# Patient Record
Sex: FEMALE | Race: WHITE | NOT HISPANIC OR LATINO | URBAN - METROPOLITAN AREA
[De-identification: names, ages, dates, MRNs, and addresses within clinical notes are randomized per-mention and may not be internally consistent; named-entity substitution may affect disease eponyms.]

---

## 2017-07-06 ENCOUNTER — FOLLOW UP ESTABLISHED (OUTPATIENT)
Dept: URBAN - METROPOLITAN AREA CLINIC 64 | Facility: CLINIC | Age: 62
End: 2017-07-06

## 2017-07-06 DIAGNOSIS — H35.373 PUCKERING OF MACULA, BILATERAL: ICD-10-CM

## 2017-07-06 DIAGNOSIS — H52.13 MYOPIA, BILATERAL: Primary | ICD-10-CM

## 2017-07-06 PROCEDURE — S0621 ROUTINE OPHTHALMOLOGICAL EXA: HCPCS | Performed by: OPTOMETRIST

## 2017-07-06 ASSESSMENT — KERATOMETRY
OD: 45.29
OS: 44.98

## 2017-07-06 ASSESSMENT — INTRAOCULAR PRESSURE
OD: 20
OS: 20

## 2017-07-06 ASSESSMENT — VISUAL ACUITY
OS: 20/30
OD: 20/20

## 2017-08-15 ENCOUNTER — FOLLOW UP ESTABLISHED (OUTPATIENT)
Dept: URBAN - METROPOLITAN AREA CLINIC 64 | Facility: CLINIC | Age: 62
End: 2017-08-15
Payer: COMMERCIAL

## 2017-08-15 PROCEDURE — 92134 CPTRZ OPH DX IMG PST SGM RTA: CPT | Performed by: OPTOMETRIST

## 2017-08-15 PROCEDURE — 92014 COMPRE OPH EXAM EST PT 1/>: CPT | Performed by: OPTOMETRIST

## 2017-08-15 ASSESSMENT — INTRAOCULAR PRESSURE
OD: 19
OS: 22

## 2019-05-23 ENCOUNTER — FOLLOW UP ESTABLISHED (OUTPATIENT)
Dept: URBAN - METROPOLITAN AREA CLINIC 64 | Facility: CLINIC | Age: 64
End: 2019-05-23
Payer: COMMERCIAL

## 2019-05-23 PROCEDURE — 92014 COMPRE OPH EXAM EST PT 1/>: CPT | Performed by: OPTOMETRIST

## 2019-05-23 PROCEDURE — 92015 DETERMINE REFRACTIVE STATE: CPT | Performed by: OPTOMETRIST

## 2019-05-23 ASSESSMENT — INTRAOCULAR PRESSURE
OD: 16
OS: 16

## 2019-05-23 ASSESSMENT — KERATOMETRY
OD: 45.31
OS: 44.98

## 2019-05-23 ASSESSMENT — VISUAL ACUITY
OD: 20/20
OS: 20/40

## 2019-06-05 ENCOUNTER — RX CHECK (OUTPATIENT)
Dept: URBAN - METROPOLITAN AREA CLINIC 64 | Facility: CLINIC | Age: 64
End: 2019-06-05

## 2019-06-05 PROCEDURE — 92015 DETERMINE REFRACTIVE STATE: CPT | Performed by: OPTOMETRIST

## 2019-06-05 ASSESSMENT — VISUAL ACUITY
OS: 20/40
OD: 20/20

## 2020-01-23 ENCOUNTER — FOLLOW UP ESTABLISHED (OUTPATIENT)
Dept: URBAN - METROPOLITAN AREA CLINIC 64 | Facility: CLINIC | Age: 65
End: 2020-01-23
Payer: MEDICARE

## 2020-01-23 PROCEDURE — 92014 COMPRE OPH EXAM EST PT 1/>: CPT | Performed by: OPTOMETRIST

## 2020-01-23 PROCEDURE — 92134 CPTRZ OPH DX IMG PST SGM RTA: CPT | Performed by: OPTOMETRIST

## 2020-01-23 ASSESSMENT — INTRAOCULAR PRESSURE
OS: 21
OD: 19

## 2020-02-20 ENCOUNTER — FOLLOW UP ESTABLISHED (OUTPATIENT)
Dept: URBAN - METROPOLITAN AREA CLINIC 24 | Facility: CLINIC | Age: 65
End: 2020-02-20
Payer: MEDICARE

## 2020-02-20 DIAGNOSIS — H43.391 OTHER VITREOUS OPACITIES, RIGHT EYE: ICD-10-CM

## 2020-02-20 PROCEDURE — 92134 CPTRZ OPH DX IMG PST SGM RTA: CPT | Performed by: OPHTHALMOLOGY

## 2020-02-20 PROCEDURE — 92004 COMPRE OPH EXAM NEW PT 1/>: CPT | Performed by: OPHTHALMOLOGY

## 2020-02-20 ASSESSMENT — INTRAOCULAR PRESSURE
OD: 14
OS: 15

## 2021-05-03 ENCOUNTER — OFFICE VISIT (OUTPATIENT)
Dept: URBAN - METROPOLITAN AREA CLINIC 64 | Facility: CLINIC | Age: 66
End: 2021-05-03
Payer: COMMERCIAL

## 2021-05-03 DIAGNOSIS — H33.052 TOTAL RETINAL DETACHMENT, LEFT EYE: ICD-10-CM

## 2021-05-03 PROCEDURE — 92014 COMPRE OPH EXAM EST PT 1/>: CPT | Performed by: OPTOMETRIST

## 2021-05-03 ASSESSMENT — VISUAL ACUITY
OS: 20/40
OD: 20/20

## 2021-05-03 ASSESSMENT — KERATOMETRY
OS: 44.67
OD: 45.26

## 2021-05-03 ASSESSMENT — INTRAOCULAR PRESSURE
OD: 13
OS: 15

## 2021-05-03 NOTE — IMPRESSION/PLAN
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 Plan: Compromised VA due to 2x RD. Stable. Disc.

## 2023-03-08 ENCOUNTER — OFFICE VISIT (OUTPATIENT)
Dept: URBAN - METROPOLITAN AREA CLINIC 64 | Facility: CLINIC | Age: 68
End: 2023-03-08
Payer: MEDICARE

## 2023-03-08 DIAGNOSIS — H33.052 TOTAL RETINAL DETACHMENT, LEFT EYE: Primary | ICD-10-CM

## 2023-03-08 DIAGNOSIS — H43.391 OTHER VITREOUS OPACITIES, RIGHT EYE: ICD-10-CM

## 2023-03-08 PROCEDURE — 99214 OFFICE O/P EST MOD 30 MIN: CPT | Performed by: OPTOMETRIST

## 2023-03-08 ASSESSMENT — INTRAOCULAR PRESSURE
OS: 18
OD: 22

## 2023-03-08 ASSESSMENT — KERATOMETRY
OS: 44.16
OD: 45.35

## 2023-03-08 NOTE — IMPRESSION/PLAN
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 Plan: scleral buckle in place, retina attached. Stable, continue to monitor.

## 2023-04-17 ENCOUNTER — OFFICE VISIT (OUTPATIENT)
Dept: URBAN - METROPOLITAN AREA CLINIC 64 | Facility: CLINIC | Age: 68
End: 2023-04-17

## 2023-04-17 DIAGNOSIS — H02.412 MECHANICAL PTOSIS OF LEFT EYELID: Primary | ICD-10-CM

## 2023-04-17 DIAGNOSIS — H52.4 PRESBYOPIA: ICD-10-CM

## 2023-04-17 ASSESSMENT — VISUAL ACUITY
OD: 20/20
OS: 20/40

## 2023-04-17 NOTE — IMPRESSION/PLAN
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 Plan: Recommend consult with Dr. Mami Alberto.

## 2023-05-23 ENCOUNTER — OFFICE VISIT (OUTPATIENT)
Dept: URBAN - METROPOLITAN AREA CLINIC 64 | Facility: CLINIC | Age: 68
End: 2023-05-23
Payer: MEDICARE

## 2023-05-23 DIAGNOSIS — H02.423 MYOGENIC PTOSIS OF BILATERAL EYELIDS: Primary | ICD-10-CM

## 2023-05-23 PROCEDURE — 92082 INTERMEDIATE VISUAL FIELD XM: CPT | Performed by: STUDENT IN AN ORGANIZED HEALTH CARE EDUCATION/TRAINING PROGRAM

## 2023-05-23 PROCEDURE — 99204 OFFICE O/P NEW MOD 45 MIN: CPT | Performed by: STUDENT IN AN ORGANIZED HEALTH CARE EDUCATION/TRAINING PROGRAM

## 2023-05-23 PROCEDURE — 92285 EXTERNAL OCULAR PHOTOGRAPHY: CPT | Performed by: STUDENT IN AN ORGANIZED HEALTH CARE EDUCATION/TRAINING PROGRAM

## 2023-05-23 RX ORDER — NEOMYCIN SULFATE, POLYMYXIN B SULFATE AND DEXAMETHASONE 3.5; 10000; 1 MG/G; [USP'U]/G; MG/G
OINTMENT OPHTHALMIC
Qty: 2 | Refills: 0 | Status: ACTIVE
Start: 2023-05-23

## 2023-05-23 NOTE — IMPRESSION/PLAN
Impression: Myogenic ptosis of bilateral eyelids: H02.423. Plan: [place] screen VF & photos were performed and interpreted today and demonstrated restriction of superior and temporal visual fields. This reverted to normal demonstrating >30% increase in visual field with mechanical elevation of the eyelid and brow. The degree of ptosis will require an external levator resection to correct the eyelid ptosis. RBA were discussed with the patient and all questions were answered. Visual field shows restriction of superior and temporal visual fields in resting position. With eyelids/brows elevated/taped there is a significant (>30%) improvement in the superior and temporal visual fields EARLINE>>RUL ptosis. Onset after retina surgery x 2. Good levator function. ++ Martinez's response so recommend ELR OU with minimal advancement RUL.  
Skin and medial fat OU

## 2023-08-02 ENCOUNTER — ADULT PHYSICAL (OUTPATIENT)
Dept: URBAN - METROPOLITAN AREA CLINIC 64 | Facility: LOCATION | Age: 68
End: 2023-08-02
Payer: MEDICARE

## 2023-08-02 DIAGNOSIS — H02.423 MYOGENIC PTOSIS OF BILATERAL EYELIDS: ICD-10-CM

## 2023-08-02 DIAGNOSIS — Z01.818 ENCOUNTER FOR OTHER PREPROCEDURAL EXAMINATION: Primary | ICD-10-CM

## 2023-08-02 PROCEDURE — 99203 OFFICE O/P NEW LOW 30 MIN: CPT | Performed by: PHYSICIAN ASSISTANT

## 2023-08-14 ENCOUNTER — SURGERY (OUTPATIENT)
Dept: URBAN - METROPOLITAN AREA SURGERY 42 | Facility: SURGERY | Age: 68
End: 2023-08-14
Payer: MEDICARE

## 2023-08-14 RX ORDER — NEOMYCIN SULFATE, POLYMYXIN B SULFATE AND DEXAMETHASONE 3.5; 10000; 1 MG/G; [USP'U]/G; MG/G
OINTMENT OPHTHALMIC
Qty: 2 | Refills: 2 | Status: ACTIVE
Start: 2023-08-14

## 2023-08-14 RX ORDER — ERYTHROMYCIN 5 MG/G
OINTMENT OPHTHALMIC
Qty: 1 | Refills: 5 | Status: INACTIVE
Start: 2023-08-14 | End: 2023-08-27

## 2023-08-23 ENCOUNTER — POST-OPERATIVE VISIT (OUTPATIENT)
Dept: URBAN - METROPOLITAN AREA CLINIC 64 | Facility: LOCATION | Age: 68
End: 2023-08-23
Payer: MEDICARE

## 2023-08-23 DIAGNOSIS — Z48.89 ENCOUNTER FOR OTHER SPECIFIED SURGICAL AFTERCARE: Primary | ICD-10-CM

## 2023-08-23 PROCEDURE — 99024 POSTOP FOLLOW-UP VISIT: CPT | Performed by: OPTOMETRIST

## 2023-08-28 ENCOUNTER — POST-OPERATIVE VISIT (OUTPATIENT)
Dept: URBAN - METROPOLITAN AREA CLINIC 64 | Facility: LOCATION | Age: 68
End: 2023-08-28
Payer: MEDICARE

## 2023-08-28 DIAGNOSIS — Z48.89 ENCOUNTER FOR OTHER SPECIFIED SURGICAL AFTERCARE: Primary | ICD-10-CM

## 2023-08-28 PROCEDURE — 99024 POSTOP FOLLOW-UP VISIT: CPT | Performed by: OPTOMETRIST

## 2023-09-22 ENCOUNTER — OFFICE VISIT (OUTPATIENT)
Dept: URBAN - METROPOLITAN AREA CLINIC 64 | Facility: LOCATION | Age: 68
End: 2023-09-22
Payer: MEDICARE

## 2023-09-22 DIAGNOSIS — L03.213 PRESEPTAL CELLULITIS: Primary | ICD-10-CM

## 2023-09-22 PROCEDURE — 99024 POSTOP FOLLOW-UP VISIT: CPT

## 2023-09-22 RX ORDER — AMOXICILLIN AND CLAVULANATE POTASSIUM 500; 125 MG/1; 1/1
TABLET, FILM COATED ORAL
Qty: 20 | Refills: 0 | Status: ACTIVE
Start: 2023-09-22

## 2023-09-26 ENCOUNTER — OFFICE VISIT (OUTPATIENT)
Dept: URBAN - METROPOLITAN AREA CLINIC 64 | Facility: LOCATION | Age: 68
End: 2023-09-26

## 2023-09-26 DIAGNOSIS — H52.4 PRESBYOPIA: Primary | ICD-10-CM

## 2023-09-26 ASSESSMENT — VISUAL ACUITY: OD: 20/20

## 2023-09-26 ASSESSMENT — KERATOMETRY
OS: 45.40
OD: 45.35

## 2024-10-16 ENCOUNTER — OFFICE VISIT (OUTPATIENT)
Dept: URBAN - METROPOLITAN AREA CLINIC 64 | Facility: LOCATION | Age: 69
End: 2024-10-16
Payer: MEDICARE

## 2024-10-16 DIAGNOSIS — H18.513 FUCHS' DYSTROPHY: ICD-10-CM

## 2024-10-16 DIAGNOSIS — H00.011 HORDEOLUM EXTERNUM RIGHT UPPER EYELID: Primary | ICD-10-CM

## 2024-10-16 PROCEDURE — 99214 OFFICE O/P EST MOD 30 MIN: CPT

## 2024-10-16 RX ORDER — DOXYCYCLINE HYCLATE 100 MG/1
100 MG CAPSULE, GELATIN COATED ORAL
Qty: 14 | Refills: 0 | Status: ACTIVE
Start: 2024-10-16

## 2024-10-16 ASSESSMENT — INTRAOCULAR PRESSURE
OS: 13
OD: 14

## 2024-10-23 ENCOUNTER — OFFICE VISIT (OUTPATIENT)
Dept: URBAN - METROPOLITAN AREA CLINIC 64 | Facility: LOCATION | Age: 69
End: 2024-10-23
Payer: MEDICARE

## 2024-10-23 DIAGNOSIS — H18.513 FUCHS' DYSTROPHY: Primary | ICD-10-CM

## 2024-10-23 PROCEDURE — 99213 OFFICE O/P EST LOW 20 MIN: CPT | Performed by: OPTOMETRIST
